# Patient Record
Sex: FEMALE | Race: WHITE
[De-identification: names, ages, dates, MRNs, and addresses within clinical notes are randomized per-mention and may not be internally consistent; named-entity substitution may affect disease eponyms.]

---

## 2020-07-02 ENCOUNTER — HOSPITAL ENCOUNTER (OUTPATIENT)
Dept: HOSPITAL 92 - BICULT | Age: 50
Discharge: HOME | End: 2020-07-02
Attending: PLASTIC SURGERY
Payer: COMMERCIAL

## 2020-07-02 DIAGNOSIS — N60.01: ICD-10-CM

## 2020-07-02 DIAGNOSIS — N63.10: Primary | ICD-10-CM

## 2020-07-02 NOTE — ULT
ULTRASOUND RIGHT BREAST:

 

INDICATION: 

Ultrasound right breast performed to evaluate nodular density seen in the lower right breast on mammo
graphy.

 

A small cyst is seen at 6 o'clock measuring approximately 5-7 mm.  Another small cyst at 8 o'clock is
 seen measuring 3-5 mm.

 

No solid mass or nodule.  No suspicious sonographic abnormality.

 

IMPRESSION: 

There are 2 cysts seen on ultrasound corresponding to the mammogram nodules.

 

Ultrasound findings are BIRADS 2, benign findings.